# Patient Record
Sex: MALE | Race: BLACK OR AFRICAN AMERICAN | Employment: FULL TIME | URBAN - METROPOLITAN AREA
[De-identification: names, ages, dates, MRNs, and addresses within clinical notes are randomized per-mention and may not be internally consistent; named-entity substitution may affect disease eponyms.]

---

## 2021-09-15 ENCOUNTER — OFFICE VISIT (OUTPATIENT)
Dept: URGENT CARE | Facility: URGENT CARE | Age: 41
End: 2021-09-15
Payer: COMMERCIAL

## 2021-09-15 VITALS
TEMPERATURE: 98.3 F | SYSTOLIC BLOOD PRESSURE: 147 MMHG | DIASTOLIC BLOOD PRESSURE: 82 MMHG | HEART RATE: 72 BPM | RESPIRATION RATE: 20 BRPM | WEIGHT: 163.38 LBS

## 2021-09-15 DIAGNOSIS — R30.0 DYSURIA: Primary | ICD-10-CM

## 2021-09-15 DIAGNOSIS — Z20.2 STD EXPOSURE: ICD-10-CM

## 2021-09-15 LAB
ALBUMIN UR-MCNC: NEGATIVE MG/DL
APPEARANCE UR: CLEAR
BILIRUB UR QL STRIP: NEGATIVE
COLOR UR AUTO: YELLOW
GLUCOSE UR STRIP-MCNC: NEGATIVE MG/DL
HGB UR QL STRIP: NEGATIVE
KETONES UR STRIP-MCNC: NEGATIVE MG/DL
LEUKOCYTE ESTERASE UR QL STRIP: NEGATIVE
NITRATE UR QL: NEGATIVE
PH UR STRIP: 6 [PH] (ref 5–7)
SP GR UR STRIP: >=1.03 (ref 1–1.03)
UROBILINOGEN UR STRIP-ACNC: 0.2 E.U./DL

## 2021-09-15 PROCEDURE — 81003 URINALYSIS AUTO W/O SCOPE: CPT | Performed by: FAMILY MEDICINE

## 2021-09-15 PROCEDURE — 99203 OFFICE O/P NEW LOW 30 MIN: CPT | Mod: 25 | Performed by: FAMILY MEDICINE

## 2021-09-15 PROCEDURE — 96372 THER/PROPH/DIAG INJ SC/IM: CPT | Performed by: FAMILY MEDICINE

## 2021-09-15 PROCEDURE — 87491 CHLMYD TRACH DNA AMP PROBE: CPT | Performed by: FAMILY MEDICINE

## 2021-09-15 PROCEDURE — 87591 N.GONORRHOEAE DNA AMP PROB: CPT | Performed by: FAMILY MEDICINE

## 2021-09-15 RX ORDER — AZITHROMYCIN 500 MG/1
1000 TABLET, FILM COATED ORAL DAILY
Qty: 2 TABLET | Refills: 0 | Status: SHIPPED | OUTPATIENT
Start: 2021-09-15 | End: 2021-09-16

## 2021-09-15 NOTE — PATIENT INSTRUCTIONS
Patient Education     Urethritis in Men  The urethra is the tube that runs from the bladder through the penis. When the urethra is inflamed, it is called urethritis. The urethra becomes swollen and causes burning pain when you urinate. Other symptoms of urethritis may include itching or tingling of the penis, or pus discharge from the penis. You may also have pain with sex and masturbation. Some men may not have symptoms.      An inflamed urethra can cause pain during urination.   What causes urethritis?  Urethritis can be caused by a bacterial or viral infection. Such an infection can lead to conditions such as a urinary tract infection (UTI) or sexually transmitted infections (STIs). Urethritis can also be caused by injury or sensitivity or allergy to chemicals in lotions and other products.   How is urethritis diagnosed?  Your healthcare provider will examine you and ask about your symptoms and health history. You may also have one or more of the following tests:     Urine test. This is done to take urine samples and have them checked for problems.    Blood test. A blood sample is taken and checked for problems.    Urethral discharge culture. A fluid sample is taken from inside the urethra and checked under a microscope. To get the sample, a cotton swab is inserted into the opening of the penis and into the urethra.    Cystoscopy. This test lets the healthcare provider look for problems in the urinary tract. The test uses a thin, flexible tube (cystoscope). It has a light and camera attached. The scope is inserted into the urethra.  How is urethritis treated?  Treatment depends on the cause of urethritis. If it s from a bacterial infection, medicines that fight infection (antibiotics) will be given. Your healthcare provider can tell you more about your treatment options. In the meantime, your symptoms can be treated. To ease pain and swelling, anti-inflammatory medicines, such as ibuprofen, may be given. If not  treated, symptoms may get worse. Also, scar tissue can form in the urethra, causing it to narrow.   When to call your healthcare provider   Call your healthcare provider right away if you have any of the following:     Fever of  100.4  F ( 38 C ) or higher, or as directed by your provider    Blood in the urine or semen    Burning pain with urination    Increased urge to urinate    Discharge from the penis    Itching, soreness, or swelling in the penis or groin    Pain during sex or masturbation    Inability to urinate  Preventing STIs  When it comes to sex, it s important to take care and be safe. Any sexual contact with the penis, vagina, anus, or mouth can spread an STI. The only sure way to prevent STIs is not having sex. But there are ways to make sex safer. Use a latex condom each time you have sex. And talk with your partner about STIs before you have sex.   [employer] last reviewed this educational content on 2/1/2020 2000-2021 [employer]. All rights reserved. This information is not intended as a substitute for professional medical care. Always follow your healthcare professional's instructions.

## 2021-09-15 NOTE — LETTER
September 16, 2021      Leticia Perry  [address]  [place] [address]        Dear ,    We are writing to inform you of your test results.    Your test results fall within the expected range(s) and negative STD results.  Enclosed is a copy of these results.      Resulted Orders   UA Macro with Reflex to Micro and Culture - lab collect   Result Value Ref Range    Color Urine Yellow Colorless, Straw, Light Yellow, Yellow    Appearance Urine Clear Clear    Glucose Urine Negative Negative mg/dL    Bilirubin Urine Negative Negative    Ketones Urine Negative Negative mg/dL    Specific Gravity Urine >=1.030 1.003 - 1.035    Blood Urine Negative Negative    pH Urine 6.0 5.0 - 7.0    Protein Albumin Urine Negative Negative mg/dL    Urobilinogen Urine 0.2 0.2, 1.0 E.U./dL    Nitrite Urine Negative Negative    Leukocyte Esterase Urine Negative Negative    Narrative    Microscopic not indicated   NEISSERIA GONORRHOEA PCR   Result Value Ref Range    Neisseria gonorrhoeae Negative Negative      Comment:      Negative for N. gonorrhoeae rRNA by transcription mediated amplification. A negative result by transcription mediated amplification does not preclude the presence of C. trachomatis infection because results are dependent on proper and adequate collection, absence of inhibitors and sufficient rRNA to be detected.   CHLAMYDIA TRACHOMATIS PCR   Result Value Ref Range    Chlamydia trachomatis Negative Negative      Comment:      A negative result by transcription mediated amplification does not preclude the presence of C. trachomatis infection because results are dependent on proper and adequate collection, absence of inhibitors and sufficient rRNA to be detected.       If you have any questions or concerns, please call the clinic at the number listed above.   Thank you for choosing [facility].      Sincerely,      Bahman Murphy MD

## 2021-09-16 LAB
C TRACH DNA SPEC QL NAA+PROBE: NEGATIVE
N GONORRHOEA DNA SPEC QL NAA+PROBE: NEGATIVE

## 2021-09-21 NOTE — PROGRESS NOTES
SUBJECTIVE:  Leticia Perry, a 40 year old male scheduled an appointment to discuss the following issues:     Dysuria  STD exposure    Medical, social, surgical, and family histories reviewed.     UTI (when urinating sometimes it has a burning sensation. )    Intermittent dysuria for the past 2 weeks.  For a couple of years he has had similar problems and has received antibiotics.  Denies abdominal or back pain.  Having sex with two females.  Concerned re STD.  Denies penile discharge.    ROS:  See HPI.  No nausea/vomiting.  No fever/chills.  No chest pain/SOB.  No BM problems.  No dizziness or syncope.      OBJECTIVE:  BP (!) 147/82 (BP Location: Left arm, Patient Position: Sitting, Cuff Size: Adult Regular)   Pulse 72   Temp 98.3  F (36.8  C) (Oral)   Resp 20   Wt 74.1 kg (163 lb 6 oz)   EXAM:  GENERAL APPEARANCE: alert and mild distress; afebrile; not septic  EYES: Eyes grossly normal to inspection, PERRL and conjunctivae and sclerae normal  HENT: ear canals and TM's normal and nose and mouth without ulcers or lesions  NECK: no adenopathy, no asymmetry, masses, or scars and thyroid normal to palpation  RESP: lungs clear to auscultation - no rales, rhonchi or wheezes  CV: regular rates and rhythm, normal S1 S2, no S3 or S4 and no murmur, click or rub  LYMPHATICS: no cervical adenopathy  ABDOMEN: soft, nontender, without hepatosplenomegaly or masses and bowel sounds normal; no CVA tenderness; no hernia  MS: extremities normal- no gross deformities noted  SKIN: no suspicious lesions or rashes  NEURO: Normal strength and tone, mentation intact and speech normal    ASSESSMENT/PLAN:  (R30.0) Dysuria  (primary encounter diagnosis)  Comment: urinalysis negative  Plan: UA Macro with Reflex to Micro and Culture - lab        collect, NEISSERIA GONORRHOEA PCR, CHLAMYDIA         TRACHOMATIS PCR      (Z20.2) STD exposure  Plan: azithromycin (ZITHROMAX) 500 MG tablet,         cefTRIAXone (ROCEPHIN) injection 500  mg    Safe sex practice advised.  Pt to f/up PCP within 1 week if no improvement or worsening.  Warning signs and symptoms explained.

## 2023-03-03 ENCOUNTER — LAB (OUTPATIENT)
Dept: URGENT CARE | Facility: URGENT CARE | Age: 43
End: 2023-03-03
Payer: COMMERCIAL

## 2023-03-03 DIAGNOSIS — Z20.822 ENCOUNTER FOR LABORATORY TESTING FOR COVID-19 VIRUS: ICD-10-CM

## 2023-03-03 PROCEDURE — 99207 PR NO CHARGE LOS: CPT

## 2023-03-03 PROCEDURE — U0005 INFEC AGEN DETEC AMPLI PROBE: HCPCS

## 2023-03-03 PROCEDURE — U0003 INFECTIOUS AGENT DETECTION BY NUCLEIC ACID (DNA OR RNA); SEVERE ACUTE RESPIRATORY SYNDROME CORONAVIRUS 2 (SARS-COV-2) (CORONAVIRUS DISEASE [COVID-19]), AMPLIFIED PROBE TECHNIQUE, MAKING USE OF HIGH THROUGHPUT TECHNOLOGIES AS DESCRIBED BY CMS-2020-01-R: HCPCS

## 2023-03-04 LAB — SARS-COV-2 RNA RESP QL NAA+PROBE: NEGATIVE
